# Patient Record
Sex: FEMALE | Race: WHITE | Employment: STUDENT | URBAN - METROPOLITAN AREA
[De-identification: names, ages, dates, MRNs, and addresses within clinical notes are randomized per-mention and may not be internally consistent; named-entity substitution may affect disease eponyms.]

---
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
## 2023-01-21 NOTE — TELEPHONE ENCOUNTER
Called Jesse from Lab; he states that this is a phosphorous level. Code obtained from lab and entered     Call attempt to dad to clarify his orders. Order pended for your sign off. No

## (undated) NOTE — LETTER
[address] [place] of Child Health Examination       Student's Name  [facility] Title                           Date    (If adding dates to the above immunization history section, put your initials by date(s) and sign here. allergies. MEDICATION  (List all prescribed or taken on a regular basis.)  No current outpatient medications on file. Diagnosis of asthma?   Child wakes during the night coughing   Yes   No    Yes   No    Loss of function of one of paired organs? (eye/ear (hypertension, dyslipidemia, polycystic ovarian syndrome, acanthosis nigricans)    No           At Risk  No   Lead Risk Questionnaire  Req'd for children 6 months thru 6 yrs enrolled in licensed or public school operated day care, ,  nursery [facility] None DIETARY Needs/Restrictions     None   SPECIAL INSTRUCTIONS/DEVICES e.g. safety glasses, glass eye, chest protector for arrhythmia, pacemaker, prosthetic device, dental bridge, false teeth, athleticsupport/cup     None   MENTAL HEALTH/OTHER   Is there

## (undated) NOTE — LETTER
VACCINE ADMINISTRATION RECORD  PARENT / GUARDIAN APPROVAL  Date: 2018  Vaccine administered to: Luna Rodrigues     : 2005    MRN: [id number]    A copy of the appropriate Centers for Disease Control and Prevention Vaccine Information sta

## (undated) NOTE — LETTER
9/25/2018              [address] [id number]         To Whom it May Concern,    Please excuse Haydee Haas from school this past week due to anxiety.      Sincerely,        MD Bharathi Walters

## (undated) NOTE — LETTER
4/16/2018              [facility] [id number]         Dietician evaluation    Dx:  Extreme weight loss and suspicion of eating disorder      Sincerely,    Dany Estevez MD  [address] [place] [address]

## (undated) NOTE — LETTER
3/14/2018              [address] [id number]         To Whom It May Concern,    Please excuse Terry Noe from school this am as she had a doctor appointment. She can return to school today.  Her weight has luz

## (undated) NOTE — LETTER
9/27/2018              [address] [id number]         To Whom It May Concern,    Please excuse Constance Kraft from school the past week.  She was seen in our office 9/25 to discuss her eating disorder and anxiety t

## (undated) NOTE — LETTER
VACCINE ADMINISTRATION RECORD  PARENT / GUARDIAN APPROVAL  Date: 10/26/2021  Vaccine administered to: Carlo Brown     : 2005    MRN: [id number]    A copy of the appropriate Centers for Disease Control and Prevention Vaccine Information st

## (undated) NOTE — LETTER
2018              Alaina Victor ( [birth year])        [address] [place] [id number]         To whom it may concern,    Below are the medical diagnoses from 18 office visit.      Abnormal weight loss  R63.4  Anxiety   F41.9

## (undated) NOTE — LETTER
[address] [place] of Child Health Examination       Student's Name  [address] [facility] Date     Signature                                                                                                                                              Title                           Date    (If adding dates to the above immu ALLERGIES  (Food, drug, insect, other)  Patient has no known allergies. MEDICATION  (List all prescribed or taken on a regular basis.)  No current outpatient prescriptions on file. Diagnosis of asthma?   Child wakes during the night coughing   Yes   No DIABETES SCREENING  BMI>85% age/sex  yes And any two of the following:  Family History No    Ethnic Minority  No          Signs of Insulin Resistance (hypertension, dyslipidemia, polycystic ovarian syndrome, acanthosis nigricans)    No           At Risk  N Quick-relief  medication (e.g. Short Acting Beta Antagonist): No          Controller medication (e.g. inhaled corticosteroid):   No Other   NEEDS/MODIFICATIONS required in the school setting  None DIETARY Needs/Restrictions     None   SPECIAL INSTR